# Patient Record
Sex: FEMALE | Race: WHITE | NOT HISPANIC OR LATINO | Employment: FULL TIME | URBAN - METROPOLITAN AREA
[De-identification: names, ages, dates, MRNs, and addresses within clinical notes are randomized per-mention and may not be internally consistent; named-entity substitution may affect disease eponyms.]

---

## 2019-09-16 ENCOUNTER — APPOINTMENT (OUTPATIENT)
Dept: PREADMISSION TESTING | Facility: HOSPITAL | Age: 39
End: 2019-09-16

## 2019-09-16 VITALS — HEIGHT: 66 IN | BODY MASS INDEX: 33.13 KG/M2 | WEIGHT: 206.13 LBS

## 2019-09-16 LAB
ANION GAP SERPL CALCULATED.3IONS-SCNC: 12 MMOL/L (ref 5–15)
BUN BLD-MCNC: 15 MG/DL (ref 6–20)
BUN/CREAT SERPL: 19.5 (ref 7–25)
CALCIUM SPEC-SCNC: 9.5 MG/DL (ref 8.6–10.5)
CHLORIDE SERPL-SCNC: 104 MMOL/L (ref 98–107)
CO2 SERPL-SCNC: 25 MMOL/L (ref 22–29)
CREAT BLD-MCNC: 0.77 MG/DL (ref 0.57–1)
DEPRECATED RDW RBC AUTO: 40.8 FL (ref 37–54)
ERYTHROCYTE [DISTWIDTH] IN BLOOD BY AUTOMATED COUNT: 11.9 % (ref 12.3–15.4)
GFR SERPL CREATININE-BSD FRML MDRD: 84 ML/MIN/1.73
GLUCOSE BLD-MCNC: 81 MG/DL (ref 65–99)
HCT VFR BLD AUTO: 42.1 % (ref 34–46.6)
HGB BLD-MCNC: 13.8 G/DL (ref 12–15.9)
MCH RBC QN AUTO: 30.4 PG (ref 26.6–33)
MCHC RBC AUTO-ENTMCNC: 32.8 G/DL (ref 31.5–35.7)
MCV RBC AUTO: 92.7 FL (ref 79–97)
PLATELET # BLD AUTO: 303 10*3/MM3 (ref 140–450)
PMV BLD AUTO: 9.6 FL (ref 6–12)
POTASSIUM BLD-SCNC: 4.5 MMOL/L (ref 3.5–5.2)
RBC # BLD AUTO: 4.54 10*6/MM3 (ref 3.77–5.28)
SODIUM BLD-SCNC: 141 MMOL/L (ref 136–145)
WBC NRBC COR # BLD: 7.23 10*3/MM3 (ref 3.4–10.8)

## 2019-09-16 PROCEDURE — 93005 ELECTROCARDIOGRAM TRACING: CPT

## 2019-09-16 PROCEDURE — 85027 COMPLETE CBC AUTOMATED: CPT | Performed by: SURGERY

## 2019-09-16 PROCEDURE — 80048 BASIC METABOLIC PNL TOTAL CA: CPT | Performed by: SURGERY

## 2019-09-16 PROCEDURE — 36415 COLL VENOUS BLD VENIPUNCTURE: CPT

## 2019-09-16 RX ORDER — DULOXETIN HYDROCHLORIDE 60 MG/1
60 CAPSULE, DELAYED RELEASE ORAL EVERY MORNING
COMMUNITY

## 2019-09-16 RX ORDER — OMEPRAZOLE 40 MG/1
40 CAPSULE, DELAYED RELEASE ORAL EVERY MORNING
COMMUNITY

## 2019-09-16 RX ORDER — NEBIVOLOL 10 MG/1
10 TABLET ORAL EVERY MORNING
COMMUNITY

## 2019-09-24 ENCOUNTER — ANESTHESIA EVENT (OUTPATIENT)
Dept: PERIOP | Facility: HOSPITAL | Age: 39
End: 2019-09-24

## 2019-09-24 RX ORDER — FAMOTIDINE 10 MG/ML
20 INJECTION, SOLUTION INTRAVENOUS ONCE
Status: CANCELLED | OUTPATIENT
Start: 2019-09-24 | End: 2019-09-24

## 2019-09-25 ENCOUNTER — HOSPITAL ENCOUNTER (OUTPATIENT)
Facility: HOSPITAL | Age: 39
Discharge: HOME | End: 2019-09-26
Attending: SURGERY | Admitting: SURGERY

## 2019-09-25 ENCOUNTER — ANESTHESIA (OUTPATIENT)
Dept: PERIOP | Facility: HOSPITAL | Age: 39
End: 2019-09-25

## 2019-09-25 DIAGNOSIS — E04.1 THYROID NODULE: ICD-10-CM

## 2019-09-25 LAB
B-HCG UR QL: NEGATIVE
INTERNAL NEGATIVE CONTROL: NEGATIVE
INTERNAL POSITIVE CONTROL: POSITIVE
Lab: NORMAL

## 2019-09-25 PROCEDURE — 88307 TISSUE EXAM BY PATHOLOGIST: CPT | Performed by: SURGERY

## 2019-09-25 PROCEDURE — 25010000002 ONDANSETRON PER 1 MG: Performed by: SURGERY

## 2019-09-25 PROCEDURE — G0378 HOSPITAL OBSERVATION PER HR: HCPCS

## 2019-09-25 PROCEDURE — 25010000002 FENTANYL CITRATE (PF) 100 MCG/2ML SOLUTION: Performed by: NURSE ANESTHETIST, CERTIFIED REGISTERED

## 2019-09-25 PROCEDURE — 25010000002 NEOSTIGMINE 10 MG/10ML SOLUTION: Performed by: NURSE ANESTHETIST, CERTIFIED REGISTERED

## 2019-09-25 PROCEDURE — 25010000002 MORPHINE PER 10 MG: Performed by: SURGERY

## 2019-09-25 PROCEDURE — 25010000002 DEXAMETHASONE PER 1 MG: Performed by: NURSE ANESTHETIST, CERTIFIED REGISTERED

## 2019-09-25 PROCEDURE — 25010000002 PHENYLEPHRINE PER 1 ML: Performed by: NURSE ANESTHETIST, CERTIFIED REGISTERED

## 2019-09-25 PROCEDURE — 25010000002 ONDANSETRON PER 1 MG: Performed by: NURSE ANESTHETIST, CERTIFIED REGISTERED

## 2019-09-25 PROCEDURE — 25010000002 PROPOFOL 10 MG/ML EMULSION: Performed by: NURSE ANESTHETIST, CERTIFIED REGISTERED

## 2019-09-25 PROCEDURE — 88334 PATH CONSLTJ SURG CYTO XM EA: CPT | Performed by: SURGERY

## 2019-09-25 PROCEDURE — 88331 PATH CONSLTJ SURG 1 BLK 1SPC: CPT | Performed by: PATHOLOGY

## 2019-09-25 PROCEDURE — 94799 UNLISTED PULMONARY SVC/PX: CPT

## 2019-09-25 PROCEDURE — 81025 URINE PREGNANCY TEST: CPT | Performed by: ANESTHESIOLOGY

## 2019-09-25 RX ORDER — NEBIVOLOL 2.5 MG/1
10 TABLET ORAL EVERY MORNING
Status: DISCONTINUED | OUTPATIENT
Start: 2019-09-26 | End: 2019-09-26 | Stop reason: HOSPADM

## 2019-09-25 RX ORDER — FAMOTIDINE 20 MG/1
20 TABLET, FILM COATED ORAL ONCE
Status: COMPLETED | OUTPATIENT
Start: 2019-09-25 | End: 2019-09-25

## 2019-09-25 RX ORDER — ONDANSETRON 2 MG/ML
4 INJECTION INTRAMUSCULAR; INTRAVENOUS EVERY 6 HOURS PRN
Status: DISCONTINUED | OUTPATIENT
Start: 2019-09-25 | End: 2019-09-26 | Stop reason: HOSPADM

## 2019-09-25 RX ORDER — ATRACURIUM BESYLATE 10 MG/ML
INJECTION, SOLUTION INTRAVENOUS AS NEEDED
Status: DISCONTINUED | OUTPATIENT
Start: 2019-09-25 | End: 2019-09-25 | Stop reason: SURG

## 2019-09-25 RX ORDER — ONDANSETRON 4 MG/1
4 TABLET, FILM COATED ORAL EVERY 6 HOURS PRN
Status: DISCONTINUED | OUTPATIENT
Start: 2019-09-25 | End: 2019-09-26 | Stop reason: HOSPADM

## 2019-09-25 RX ORDER — PROMETHAZINE HYDROCHLORIDE 25 MG/ML
12.5 INJECTION, SOLUTION INTRAMUSCULAR; INTRAVENOUS EVERY 6 HOURS PRN
Status: DISCONTINUED | OUTPATIENT
Start: 2019-09-25 | End: 2019-09-26 | Stop reason: HOSPADM

## 2019-09-25 RX ORDER — NALOXONE HCL 0.4 MG/ML
0.4 VIAL (ML) INJECTION
Status: DISCONTINUED | OUTPATIENT
Start: 2019-09-25 | End: 2019-09-26 | Stop reason: HOSPADM

## 2019-09-25 RX ORDER — SODIUM CHLORIDE 0.9 % (FLUSH) 0.9 %
3-10 SYRINGE (ML) INJECTION AS NEEDED
Status: DISCONTINUED | OUTPATIENT
Start: 2019-09-25 | End: 2019-09-25 | Stop reason: HOSPADM

## 2019-09-25 RX ORDER — FENTANYL CITRATE 50 UG/ML
INJECTION, SOLUTION INTRAMUSCULAR; INTRAVENOUS AS NEEDED
Status: DISCONTINUED | OUTPATIENT
Start: 2019-09-25 | End: 2019-09-25 | Stop reason: SURG

## 2019-09-25 RX ORDER — DEXAMETHASONE SODIUM PHOSPHATE 4 MG/ML
INJECTION, SOLUTION INTRA-ARTICULAR; INTRALESIONAL; INTRAMUSCULAR; INTRAVENOUS; SOFT TISSUE AS NEEDED
Status: DISCONTINUED | OUTPATIENT
Start: 2019-09-25 | End: 2019-09-25 | Stop reason: SURG

## 2019-09-25 RX ORDER — SODIUM CHLORIDE, SODIUM LACTATE, POTASSIUM CHLORIDE, CALCIUM CHLORIDE 600; 310; 30; 20 MG/100ML; MG/100ML; MG/100ML; MG/100ML
50 INJECTION, SOLUTION INTRAVENOUS CONTINUOUS
Status: DISCONTINUED | OUTPATIENT
Start: 2019-09-25 | End: 2019-09-26 | Stop reason: HOSPADM

## 2019-09-25 RX ORDER — SCOLOPAMINE TRANSDERMAL SYSTEM 1 MG/1
1 PATCH, EXTENDED RELEASE TRANSDERMAL ONCE
Status: DISCONTINUED | OUTPATIENT
Start: 2019-09-25 | End: 2019-09-26 | Stop reason: HOSPADM

## 2019-09-25 RX ORDER — OXYCODONE HYDROCHLORIDE AND ACETAMINOPHEN 5; 325 MG/1; MG/1
1 TABLET ORAL EVERY 4 HOURS PRN
Status: DISCONTINUED | OUTPATIENT
Start: 2019-09-25 | End: 2019-09-26 | Stop reason: HOSPADM

## 2019-09-25 RX ORDER — PROMETHAZINE HYDROCHLORIDE 25 MG/ML
12.5 INJECTION, SOLUTION INTRAMUSCULAR; INTRAVENOUS ONCE AS NEEDED
Status: DISCONTINUED | OUTPATIENT
Start: 2019-09-25 | End: 2019-09-25 | Stop reason: HOSPADM

## 2019-09-25 RX ORDER — FENTANYL CITRATE 50 UG/ML
50 INJECTION, SOLUTION INTRAMUSCULAR; INTRAVENOUS
Status: DISCONTINUED | OUTPATIENT
Start: 2019-09-25 | End: 2019-09-25 | Stop reason: HOSPADM

## 2019-09-25 RX ORDER — LIDOCAINE HYDROCHLORIDE 10 MG/ML
INJECTION, SOLUTION EPIDURAL; INFILTRATION; INTRACAUDAL; PERINEURAL AS NEEDED
Status: DISCONTINUED | OUTPATIENT
Start: 2019-09-25 | End: 2019-09-25 | Stop reason: SURG

## 2019-09-25 RX ORDER — PROMETHAZINE HYDROCHLORIDE 25 MG/1
25 SUPPOSITORY RECTAL ONCE AS NEEDED
Status: DISCONTINUED | OUTPATIENT
Start: 2019-09-25 | End: 2019-09-25 | Stop reason: HOSPADM

## 2019-09-25 RX ORDER — PROPOFOL 10 MG/ML
VIAL (ML) INTRAVENOUS AS NEEDED
Status: DISCONTINUED | OUTPATIENT
Start: 2019-09-25 | End: 2019-09-25 | Stop reason: SURG

## 2019-09-25 RX ORDER — PROMETHAZINE HYDROCHLORIDE 25 MG/1
25 TABLET ORAL ONCE AS NEEDED
Status: DISCONTINUED | OUTPATIENT
Start: 2019-09-25 | End: 2019-09-25 | Stop reason: HOSPADM

## 2019-09-25 RX ORDER — PANTOPRAZOLE SODIUM 40 MG/1
40 TABLET, DELAYED RELEASE ORAL EVERY MORNING
Status: DISCONTINUED | OUTPATIENT
Start: 2019-09-26 | End: 2019-09-26 | Stop reason: HOSPADM

## 2019-09-25 RX ORDER — NEOSTIGMINE METHYLSULFATE 1 MG/ML
INJECTION, SOLUTION INTRAVENOUS AS NEEDED
Status: DISCONTINUED | OUTPATIENT
Start: 2019-09-25 | End: 2019-09-25 | Stop reason: SURG

## 2019-09-25 RX ORDER — HYDROMORPHONE HYDROCHLORIDE 1 MG/ML
0.5 INJECTION, SOLUTION INTRAMUSCULAR; INTRAVENOUS; SUBCUTANEOUS
Status: DISCONTINUED | OUTPATIENT
Start: 2019-09-25 | End: 2019-09-25 | Stop reason: HOSPADM

## 2019-09-25 RX ORDER — DULOXETIN HYDROCHLORIDE 60 MG/1
60 CAPSULE, DELAYED RELEASE ORAL EVERY MORNING
Status: DISCONTINUED | OUTPATIENT
Start: 2019-09-26 | End: 2019-09-26 | Stop reason: HOSPADM

## 2019-09-25 RX ORDER — SODIUM CHLORIDE 0.9 % (FLUSH) 0.9 %
3 SYRINGE (ML) INJECTION EVERY 12 HOURS SCHEDULED
Status: DISCONTINUED | OUTPATIENT
Start: 2019-09-25 | End: 2019-09-25 | Stop reason: HOSPADM

## 2019-09-25 RX ORDER — MORPHINE SULFATE 4 MG/ML
4 INJECTION, SOLUTION INTRAMUSCULAR; INTRAVENOUS
Status: DISCONTINUED | OUTPATIENT
Start: 2019-09-25 | End: 2019-09-26 | Stop reason: HOSPADM

## 2019-09-25 RX ORDER — GLYCOPYRROLATE 0.2 MG/ML
INJECTION INTRAMUSCULAR; INTRAVENOUS AS NEEDED
Status: DISCONTINUED | OUTPATIENT
Start: 2019-09-25 | End: 2019-09-25 | Stop reason: SURG

## 2019-09-25 RX ORDER — ONDANSETRON 2 MG/ML
INJECTION INTRAMUSCULAR; INTRAVENOUS AS NEEDED
Status: DISCONTINUED | OUTPATIENT
Start: 2019-09-25 | End: 2019-09-25 | Stop reason: SURG

## 2019-09-25 RX ORDER — SODIUM CHLORIDE, SODIUM LACTATE, POTASSIUM CHLORIDE, CALCIUM CHLORIDE 600; 310; 30; 20 MG/100ML; MG/100ML; MG/100ML; MG/100ML
9 INJECTION, SOLUTION INTRAVENOUS CONTINUOUS
Status: DISCONTINUED | OUTPATIENT
Start: 2019-09-25 | End: 2019-09-26 | Stop reason: HOSPADM

## 2019-09-25 RX ORDER — MAGNESIUM HYDROXIDE 1200 MG/15ML
LIQUID ORAL AS NEEDED
Status: DISCONTINUED | OUTPATIENT
Start: 2019-09-25 | End: 2019-09-25 | Stop reason: HOSPADM

## 2019-09-25 RX ORDER — ONDANSETRON 2 MG/ML
4 INJECTION INTRAMUSCULAR; INTRAVENOUS ONCE AS NEEDED
Status: DISCONTINUED | OUTPATIENT
Start: 2019-09-25 | End: 2019-09-25 | Stop reason: HOSPADM

## 2019-09-25 RX ORDER — LIDOCAINE HYDROCHLORIDE 10 MG/ML
0.5 INJECTION, SOLUTION EPIDURAL; INFILTRATION; INTRACAUDAL; PERINEURAL ONCE AS NEEDED
Status: COMPLETED | OUTPATIENT
Start: 2019-09-25 | End: 2019-09-25

## 2019-09-25 RX ADMIN — MORPHINE SULFATE 4 MG: 4 INJECTION INTRAVENOUS at 12:37

## 2019-09-25 RX ADMIN — SODIUM CHLORIDE, POTASSIUM CHLORIDE, SODIUM LACTATE AND CALCIUM CHLORIDE 50 ML/HR: 600; 310; 30; 20 INJECTION, SOLUTION INTRAVENOUS at 12:23

## 2019-09-25 RX ADMIN — PHENYLEPHRINE HYDROCHLORIDE 100 MCG: 10 INJECTION INTRAVENOUS at 10:30

## 2019-09-25 RX ADMIN — SCOPALAMINE 1 PATCH: 1 PATCH, EXTENDED RELEASE TRANSDERMAL at 09:26

## 2019-09-25 RX ADMIN — ATRACURIUM BESYLATE 10 MG: 10 INJECTION, SOLUTION INTRAVENOUS at 09:50

## 2019-09-25 RX ADMIN — DEXAMETHASONE SODIUM PHOSPHATE 8 MG: 4 INJECTION, SOLUTION INTRAMUSCULAR; INTRAVENOUS at 09:45

## 2019-09-25 RX ADMIN — ATRACURIUM BESYLATE 40 MG: 10 INJECTION, SOLUTION INTRAVENOUS at 09:38

## 2019-09-25 RX ADMIN — OXYCODONE HYDROCHLORIDE AND ACETAMINOPHEN 1 TABLET: 5; 325 TABLET ORAL at 19:51

## 2019-09-25 RX ADMIN — ONDANSETRON 4 MG: 2 INJECTION INTRAMUSCULAR; INTRAVENOUS at 10:52

## 2019-09-25 RX ADMIN — GLYCOPYRROLATE 0.4 MG: 0.2 INJECTION, SOLUTION INTRAMUSCULAR; INTRAVENOUS at 11:01

## 2019-09-25 RX ADMIN — PROPOFOL 200 MG: 10 INJECTION, EMULSION INTRAVENOUS at 09:38

## 2019-09-25 RX ADMIN — NEOSTIGMINE METHYLSULFATE 2.5 MG: 1 INJECTION, SOLUTION INTRAVENOUS at 11:01

## 2019-09-25 RX ADMIN — PHENYLEPHRINE HYDROCHLORIDE 100 MCG: 10 INJECTION INTRAVENOUS at 10:41

## 2019-09-25 RX ADMIN — PHENYLEPHRINE HYDROCHLORIDE 50 MCG: 10 INJECTION INTRAVENOUS at 10:14

## 2019-09-25 RX ADMIN — FENTANYL CITRATE 50 MCG: 50 INJECTION INTRAMUSCULAR; INTRAVENOUS at 11:40

## 2019-09-25 RX ADMIN — SODIUM CHLORIDE, POTASSIUM CHLORIDE, SODIUM LACTATE AND CALCIUM CHLORIDE 9 ML/HR: 600; 310; 30; 20 INJECTION, SOLUTION INTRAVENOUS at 11:51

## 2019-09-25 RX ADMIN — FAMOTIDINE 20 MG: 20 TABLET ORAL at 09:13

## 2019-09-25 RX ADMIN — LIDOCAINE HYDROCHLORIDE 0.3 ML: 10 INJECTION, SOLUTION EPIDURAL; INFILTRATION; INTRACAUDAL; PERINEURAL at 09:12

## 2019-09-25 RX ADMIN — SODIUM CHLORIDE, POTASSIUM CHLORIDE, SODIUM LACTATE AND CALCIUM CHLORIDE: 600; 310; 30; 20 INJECTION, SOLUTION INTRAVENOUS at 10:15

## 2019-09-25 RX ADMIN — EPHEDRINE SULFATE 5 MG: 50 INJECTION INTRAMUSCULAR; INTRAVENOUS; SUBCUTANEOUS at 10:15

## 2019-09-25 RX ADMIN — ONDANSETRON 4 MG: 2 INJECTION INTRAMUSCULAR; INTRAVENOUS at 14:10

## 2019-09-25 RX ADMIN — LIDOCAINE HYDROCHLORIDE 50 MG: 10 INJECTION, SOLUTION EPIDURAL; INFILTRATION; INTRACAUDAL; PERINEURAL at 09:38

## 2019-09-25 RX ADMIN — SODIUM CHLORIDE, POTASSIUM CHLORIDE, SODIUM LACTATE AND CALCIUM CHLORIDE 9 ML/HR: 600; 310; 30; 20 INJECTION, SOLUTION INTRAVENOUS at 09:12

## 2019-09-25 RX ADMIN — PROPOFOL 25 MCG/KG/MIN: 10 INJECTION, EMULSION INTRAVENOUS at 09:44

## 2019-09-25 RX ADMIN — FENTANYL CITRATE 100 MCG: 50 INJECTION, SOLUTION INTRAMUSCULAR; INTRAVENOUS at 09:38

## 2019-09-25 RX ADMIN — PHENYLEPHRINE HYDROCHLORIDE 100 MCG: 10 INJECTION INTRAVENOUS at 10:35

## 2019-09-25 RX ADMIN — FENTANYL CITRATE 50 MCG: 50 INJECTION INTRAMUSCULAR; INTRAVENOUS at 11:25

## 2019-09-25 RX ADMIN — PHENYLEPHRINE HYDROCHLORIDE 50 MCG: 10 INJECTION INTRAVENOUS at 10:23

## 2019-09-25 RX ADMIN — GLYCOPYRROLATE 0.2 MG: 0.2 INJECTION, SOLUTION INTRAMUSCULAR; INTRAVENOUS at 10:08

## 2019-09-25 NOTE — ANESTHESIA POSTPROCEDURE EVALUATION
Patient: Carmen Lowe    Procedure Summary     Date:  09/25/19 Room / Location:   [facility] /  [facility]    Anesthesia Start:  0931 Anesthesia Stop:      Procedure:  LEFT THYROID LOBECTOMY (Left Neck) Diagnosis:      Surgeon:  Maulik Muniz MD Provider:  Anatoly Rose MD    Anesthesia Type:  general ASA Status:  3          Anesthesia Type: general  Last vitals  BP   146/95 (09/25/19 1111)   Temp   97.1 °F (36.2 °C) (09/25/19 1111)   Pulse   80 (09/25/19 1111)   Resp   16 (09/25/19 1111)     SpO2   93 % (09/25/19 1111)     Post Anesthesia Care and Evaluation    Patient location during evaluation: PACU  Patient participation: complete - patient participated  Level of consciousness: awake and alert  Pain score: 0  Pain management: adequate  Airway patency: patent  Anesthetic complications: No anesthetic complications  PONV Status: none  Cardiovascular status: hemodynamically stable and acceptable  Respiratory status: nonlabored ventilation, acceptable and nasal cannula  Hydration status: acceptable

## 2019-09-25 NOTE — PLAN OF CARE
Problem: Patient Care Overview  Goal: Plan of Care Review  Outcome: Ongoing (interventions implemented as appropriate)   09/25/19 1607 09/25/19 1657   OTHER   Outcome Summary --  VSS UOP ADQ, pain and nausea managed with PRN meds wd dressing intact will continue to monitor   Coping/Psychosocial   Plan of Care Reviewed With patient;spouse --      Goal: Discharge Needs Assessment  Outcome: Ongoing (interventions implemented as appropriate)      Problem: Pain, Acute (Adult)  Goal: Identify Related Risk Factors and Signs and Symptoms  Outcome: Ongoing (interventions implemented as appropriate)    Goal: Acceptable Pain Control/Comfort Level  Outcome: Ongoing (interventions implemented as appropriate)

## 2019-09-25 NOTE — BRIEF OP NOTE
THYROIDECTOMY  Progress Note    Carmen Lowe  9/25/2019    Pre-op Diagnosis:   Left thyroid nodule       Post-Op Diagnosis Codes:  Same    Procedure/CPT® Codes:      Procedure(s):  LEFT THYROID LOBECTOMY    Surgeon(s):  Maulik Muniz MD    Anesthesia: General    Staff:   Circulator: Comfort Torres RN; Lois Delgado RN  Scrub Person: Clare Richardson; Adela To; Sahnti Salter    Estimated Blood Loss: 5 mL    Urine Voided: * No values recorded between 9/25/2019  9:31 AM and 9/25/2019 11:09 AM *    Specimens:                Specimens     ID Source Type Tests Collected By Collected At Frozen?      A Thyroid Tissue · TISSUE PATHOLOGY EXAM   Maulik Muniz MD 9/25/19 1033 Yes     Description: Left Thyroid lobe Isthmus                Drains:      Findings: Benign appearing follicular lesion left thyroid lobe    Complications: None      Maulik Muniz MD     Date: 9/25/2019  Time: 11:15 AM

## 2019-09-25 NOTE — OP NOTE
THYROIDECTOMY  Procedure Note    Carmen Lowe  Date of Surgery:  9/25/2019    Pre-op Diagnosis:   Left thyroid nodule    Post-op Diagnosis:     Follicular lesion left thyroid lobe    Operative Procedure:   Left thyroid lobectomy and isthmusectomy    Indications:  Ms. Lowe is a 39-year-old female with a dominant left thyroid nodule.  She presents today for elective resection.    Operative note:  On 9/25/2019, the patient was taken to the operating room and placed supine on the operating table.  Following adequate induction of general endotracheal anesthesia, a roll was placed underneath the shoulders and the head neck carefully extended.  The neck and chest were prepped and draped in the standard fashion.  A transverse incision was made in the lower third of the neck.  The subcutaneous tissue was divided with the cautery.  A platysmal flap was raised to the level of the thyroid cartilage superiorly and the clavicular heads inferiorly.  The strap muscles were split in the midline.  All dissection was done under 3-1/2 power magnification.  The left thyroid lobe was approached by bluntly dissecting the strap muscles off an enlarged thyroid lobe with a dominant nodule in the lower pole.  Several small venous tributaries were divided on the substance of the gland with the bipolar cautery.  The superior pole vessels were next isolated and divided between clips.  The gland was further rotated medially and the superior parathyroid gland identified underneath the thyroid capsule at the top of the gland.  This was carefully and gently mobilized away from the thyroid substance, maintaining its vascular pedicle.  The left inferior parathyroid gland was never clearly identified, but did not appear to be adherent to the capsule of the thyroid.  The recurrent laryngeal nerve was identified coursing in its usual position in the tracheoesophageal groove.  The left thyroid lobe was then carefully and gently mobilized away  from the level of the nerve with a combination of blunt dissection and the bipolar cautery.  The left lobe and isthmus were then further mobilized across the anterior trachea where they were amputated over a hemostat.  The pedicle was suture-ligated with 3-0 silk.  The specimen was sent to pathology where frozen section examination was consistent with a benign appearing follicular lesion.  The neck was reinspected and irrigated.  Hemostasis appeared excellent.  The superior parathyroid gland appeared to be viable.  The recurrent laryngeal nerve was traced from its origin low in the neck up to its insertion into the cricothyroid membrane and appeared uninjured.  Gelfoam powder was sprayed in the wound and the strap muscles were reapproximated in the midline with running 3-0 Vicryl.  The subcutaneous tissue was reapproximated with running 3-0 Vicryl, and the skin closed with a running 4-0 Vicryl subcuticular suture.  A sterile dressing was applied in the operating room.  The patient tolerated the procedure well and there were no complications.  Estimated blood loss is 10 to 15 mL's.  Both preoperative and postoperative sponge and needle counts were correct.  The patient was taken to the recovery room in satisfactory condition.    Maulik Muniz MD     Date: 9/25/2019  Time: 11:15 AM

## 2019-09-25 NOTE — ANESTHESIA PROCEDURE NOTES
Airway  Urgency: elective    Date/Time: 9/25/2019 9:52 AM  Airway not difficult    General Information and Staff    Patient location during procedure: OR    Indications and Patient Condition  Indications for airway management: airway protection    Preoxygenated: yes  MILS not maintained throughout  Mask difficulty assessment: 1 - vent by mask    Final Airway Details  Final airway type: endotracheal airway      Successful airway: ETT  Cuffed: yes   Successful intubation technique: direct laryngoscopy  Endotracheal tube insertion site: oral  Blade: Kelsey  Blade size: 3  ETT size (mm): 7.5  Cormack-Lehane Classification: grade I - full view of glottis  Placement verified by: chest auscultation and capnometry   Measured from: lips  ETT/EBT  to lips (cm): 21  Number of attempts at approach: 1    Additional Comments  Negative epigastric sounds, Breath sound equal bilaterally with symmetric chest rise and fall

## 2019-09-25 NOTE — PROGRESS NOTES
"GEN SURG PROGRESS NOTE     LOS: 0 days   Patient Care Team:  Payal Simpson DO as PCP - General (Family Medicine)    Chief Complaint:      Subjective     Interval History:     Patient Complaints: Drowsy.  Sore throat.  Patient Denies:    History taken from:     Review of Systems:        Objective     Vital Signs  /92 (BP Location: Right arm, Patient Position: Lying)   Pulse 90   Temp 97.1 °F (36.2 °C) (Temporal)   Resp 16   Ht 167.6 cm (66\")   Wt 93.4 kg (206 lb)   LMP 09/06/2019 Comment: Mammogram never patient is 40 yo   SpO2 94%   BMI 33.25 kg/m²     Physical Exam:   Voice clear.  Dressing dry.  No neck swelling.     Results Review:     I reviewed the patient's new clinical results.  Labs  Lab Results (last 72 hours)     Procedure Component Value Units Date/Time    Tissue Pathology Exam [[id number]] Collected:  09/25/19 1033    Specimen:  Tissue from Thyroid Updated:  09/25/19 1045     Case Report --     Surgical Pathology Report                         Case: [id number]                                  Authorizing Provider:  Maulik Muniz MD         Collected:           09/25/2019 10:33 AM          Ordering Location:     [facility]   Received:            09/25/2019 10:42 AM                                 OR                                                                           Pathologist:           Dallas Daniels MD                                                        Intraop:               Dallas Daniels MD                                                        Specimen:    Thyroid, Left Thyroid lobe Isthmus                                                          Intraoperative Consultation --     Frozen section and touch preparation: Verbal report given to Dr. Muniz via speakerphone on 9/25/2019 at 10:44 AM.    FROZEN SECTION AND TOUCH PREPARATION DIAGNOSIS: Follicular nodule; no features of malignancy identified on representative frozen section and touch " preparation (PCC)              POC Urine Pregnancy Test [[id number]]  (Normal) Collected:  09/25/19 0912    Specimen:  Urine Updated:  09/25/19 0912     HCG, Urine, QL Negative     Lot Number [id number]     Comment: 2/21        Internal Positive Control Positive     Internal Negative Control Negative          Rads  Imaging Results (last 72 hours)     ** No results found for the last 72 hours. **          Assessment/Plan    Stable postop      Thyroid nodule        Maulik Muniz MD  09/25/19  2:24 PM

## 2019-09-25 NOTE — INTERVAL H&P NOTE
"[facility] Pre-op    Full history and physical note from office is up to date.  See office note attached.    BP (!) 168/111 (BP Location: Right arm, Patient Position: Lying)   Pulse 108   Temp 97.4 °F (36.3 °C) (Temporal)   Resp 18   Ht 167.6 cm (66\")   Wt 93.4 kg (206 lb)   LMP 09/06/2019 Comment: Mammogram never patient is 38 yo   SpO2 98%   BMI 33.25 kg/m²     LAB Results:  Lab Results   Component Value Date    WBC 7.23 09/16/2019    HGB 13.8 09/16/2019    HCT 42.1 09/16/2019    MCV 92.7 09/16/2019     09/16/2019    GLUCOSE 81 09/16/2019    BUN 15 09/16/2019    CREATININE 0.77 09/16/2019    EGFRIFNONA 84 09/16/2019     09/16/2019    K 4.5 09/16/2019     09/16/2019    CO2 25.0 09/16/2019    CALCIUM 9.5 09/16/2019       Cancer Staging (if applicable)  Cancer Patient: __ yes __no _X_unknown__N/A; If yes, clinical stage T:__ N:__M:__, stage group or __N/A    Assessment: Nontoxic multinodular goiter    Plan:   1. Left thyroid lobectomy  2. Anesthesia aware of elevated BP. Will continue to monitor closely.      Lilly Tillman, APRN 9/25/2019 9:16 AM  "

## 2019-09-25 NOTE — ANESTHESIA PREPROCEDURE EVALUATION
Anesthesia Evaluation     Patient summary reviewed and Nursing notes reviewed   history of anesthetic complications: PONV  NPO Solid Status: > 8 hours  NPO Liquid Status: > 2 hours           Airway   Mallampati: II  TM distance: >3 FB  Neck ROM: full  No difficulty expected  Dental - normal exam     Pulmonary - negative pulmonary ROS and normal exam    breath sounds clear to auscultation  Cardiovascular - normal exam    ECG reviewed  Rhythm: regular  Rate: normal    (+) hypertension, dysrhythmias Tachycardia,       Neuro/Psych- negative ROS  GI/Hepatic/Renal/Endo    (+) obesity,  GERD well controlled,  hypothyroidism,     Musculoskeletal     Abdominal    Substance History      OB/GYN          Other   (+) arthritis                     Anesthesia Plan    ASA 3     general     intravenous induction   Anesthetic plan, all risks, benefits, and alternatives have been provided, discussed and informed consent has been obtained with: patient.    Plan discussed with CRNA.

## 2019-09-26 VITALS
WEIGHT: 206 LBS | TEMPERATURE: 98.8 F | HEIGHT: 66 IN | SYSTOLIC BLOOD PRESSURE: 126 MMHG | OXYGEN SATURATION: 94 % | DIASTOLIC BLOOD PRESSURE: 71 MMHG | HEART RATE: 89 BPM | RESPIRATION RATE: 16 BRPM | BODY MASS INDEX: 33.11 KG/M2

## 2019-09-26 LAB
CYTO UR: NORMAL
LAB AP CASE REPORT: NORMAL
LAB AP CLINICAL INFORMATION: NORMAL
LAB AP DIAGNOSIS COMMENT: NORMAL
Lab: NORMAL
PATH REPORT.FINAL DX SPEC: NORMAL
PATH REPORT.GROSS SPEC: NORMAL

## 2019-09-26 RX ADMIN — PANTOPRAZOLE SODIUM 40 MG: 40 TABLET, DELAYED RELEASE ORAL at 06:04

## 2019-09-26 RX ADMIN — NEBIVOLOL HYDROCHLORIDE 10 MG: 2.5 TABLET ORAL at 06:04

## 2019-09-26 RX ADMIN — DULOXETINE HYDROCHLORIDE 60 MG: 60 CAPSULE, DELAYED RELEASE ORAL at 06:04

## 2019-09-26 NOTE — PLAN OF CARE
Problem: Patient Care Overview  Goal: Plan of Care Review  Outcome: Ongoing (interventions implemented as appropriate)   09/26/19 6864   OTHER   Outcome Summary Pain well controlled. Denies nausea. Tolerating diet. Voiding. Incision CDI. VSS.    Coping/Psychosocial   Plan of Care Reviewed With patient;spouse   Plan of Care Review   Progress improving     Goal: Individualization and Mutuality  Outcome: Ongoing (interventions implemented as appropriate)    Goal: Discharge Needs Assessment  Outcome: Ongoing (interventions implemented as appropriate)    Goal: Interprofessional Rounds/Family Conf  Outcome: Ongoing (interventions implemented as appropriate)      Problem: Pain, Acute (Adult)  Goal: Identify Related Risk Factors and Signs and Symptoms  Outcome: Ongoing (interventions implemented as appropriate)    Goal: Acceptable Pain Control/Comfort Level  Outcome: Ongoing (interventions implemented as appropriate)

## 2019-09-26 NOTE — PROGRESS NOTES
"Date of Discharge:  9/26/2019    Discharge Diagnosis:   Left thyroid nodule    Problem List:    Thyroid nodule      Presenting Problem/History of Present Illness  Nontoxic multinodular goiter [E04.2]  Thyroid nodule [E04.1]        Hospital Course  Patient is a 39 y.o. female presented with a large left thyroid nodule.  On 925, she underwent a left thyroid lobectomy.  Frozen section examination at the time of surgery was consistent with a benign follicular lesion.  Her postoperative course has been unremarkable.  She has been able to ambulate, void, and tolerate a diet.  Her pain has been reasonably controlled.  On the date of discharge, she is afebrile and in satisfactory condition.  Her voice is clear, her dressing is dry, and there is no neck swelling.  She is discharged home in satisfactory condition with wound care and activity limitations thoroughly discussed with her.  She will resume her medications as at home.  She declined a prescription for narcotics.  She will follow-up in my office in 2 weeks..      Procedures Performed  Procedure(s):  LEFT THYROID LOBECTOMY       Consults:   Consults     No orders found for last 30 day(s).          Pertinent Test Results:       Vital Signs  /74 (BP Location: Right arm, Patient Position: Lying)   Pulse 89   Temp 98.7 °F (37.1 °C)   Resp 16   Ht 167.6 cm (66\")   Wt 93.4 kg (206 lb)   LMP 09/06/2019 Comment: Mammogram never patient is 40 yo   SpO2 94%   BMI 33.25 kg/m²     Discharge Disposition  Where: home      Discharge Medications     Discharge Medications      Continue These Medications      Instructions Start Date   DULoxetine 60 MG capsule  Commonly known as:  CYMBALTA   60 mg, Oral, Every Morning      nebivolol 10 MG tablet  Commonly known as:  BYSTOLIC   10 mg, Oral, Every Morning      omeprazole 40 MG capsule  Commonly known as:  priLOSEC   40 mg, Oral, Every Morning      Vitamin D3 35414 units tablet   1 tablet, Oral, Weekly, Fridays        "       Discharge Diet   Regular    Activity at Discharge  Ad lady.    Follow-up Appointments  Dr. muniz in 2 weeks      Test Results Pending at Discharge  Final pathology      Maulik Muniz MD  09/26/19  7:34 AM

## 2024-01-30 ENCOUNTER — OFFICE VISIT (OUTPATIENT)
Dept: ENDOCRINOLOGY | Facility: CLINIC | Age: 44
End: 2024-01-30
Payer: COMMERCIAL

## 2024-01-30 VITALS
HEIGHT: 66 IN | OXYGEN SATURATION: 99 % | BODY MASS INDEX: 33.59 KG/M2 | WEIGHT: 209 LBS | SYSTOLIC BLOOD PRESSURE: 120 MMHG | HEART RATE: 70 BPM | DIASTOLIC BLOOD PRESSURE: 70 MMHG

## 2024-01-30 DIAGNOSIS — E04.1 THYROID NODULE: Primary | ICD-10-CM

## 2024-01-30 LAB — TSH SERPL DL<=0.05 MIU/L-ACNC: 4.08 UIU/ML (ref 0.27–4.2)

## 2024-01-30 PROCEDURE — 84443 ASSAY THYROID STIM HORMONE: CPT | Performed by: INTERNAL MEDICINE

## 2024-01-30 RX ORDER — LEVOTHYROXINE SODIUM 0.05 MG/1
50 TABLET ORAL DAILY
COMMUNITY
Start: 2023-12-12

## 2024-01-30 NOTE — PROGRESS NOTES
"     Office Note      Date: 2024  Patient Name: Carmen Lowe  MRN: [id number]  : 1980    Chief Complaint   Patient presents with    Thyroid Problem     Nodule       History of Present Illness:   Carmen Lowe is a 44 y.o. female who presents for Thyroid Problem (Nodule)  .   Patient is seen for a new patient evaluation  Pt had left hemithyroidectomy in  for a large nodule.  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  Her tsh was normal in   She had an ultrasound in 2023 which showed absent left thyroid lobe and a 1 cm trad 4 nodule in the right  lobe  ------------------------------  [place]  No hi  iodine intake  No facial radiation exposure  Family = brother and grandfather have thyroid disease.  Grand father had thyroid cancer.  Subjective          Review of Systems:   Review of Systems   Constitutional:  Positive for fatigue. Negative for unexpected weight change.   HENT:  Negative for trouble swallowing and voice change.         Feels something in her neck when she swallows    Eyes:  Negative for visual disturbance.   Cardiovascular:  Negative for palpitations.   Endocrine: Negative for cold intolerance and heat intolerance.   Neurological:  Negative for tremors.   Psychiatric/Behavioral:  Positive for dysphoric mood. Negative for sleep disturbance. The patient is nervous/anxious.        The following portions of the patient's history were reviewed and updated as appropriate: allergies, current medications, past family history, past medical history, past social history, past surgical history, and problem list.    Objective     Visit Vitals  /70 (BP Location: Left arm, Patient Position: Sitting, Cuff Size: Adult)   Pulse 70   Ht 167.6 cm (66\")   Wt 94.8 kg (209 lb)   SpO2 99%   BMI 33.73 kg/m²           Physical Exam:  Physical Exam  Vitals reviewed. "   Constitutional:       General: She is not in acute distress.     Appearance: She is not ill-appearing, toxic-appearing or diaphoretic.   HENT:      Head: Normocephalic and atraumatic.   Eyes:      Extraocular Movements: Extraocular movements intact.   Neck:      Comments: Right lobe is firm, without palpable nodules. Left lobe is surgically absent   Cardiovascular:      Rate and Rhythm: Normal rate.   Pulmonary:      Effort: Pulmonary effort is normal.   Lymphadenopathy:      Cervical: No cervical adenopathy.   Neurological:      Mental Status: She is alert.   Psychiatric:         Mood and Affect: Mood normal.         Thought Content: Thought content normal.         Judgment: Judgment normal.         Assessment / Plan      Assessment & Plan:  Problem List Items Addressed This Visit          Other    Thyroid nodule - Primary    Overview     Pt had left hemithyroidectomy in 2019 for a large nodule.  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  Her tsh was normal in 2022  She had an ultrasound in December 2023 which showed absent left thyroid lobe and a 1 cm trad 4 nodule in the right  lobe  ------------------------------  [place] was [place]  No hi  iodine intake  No facial radiation exposure  Family = brother and grandfather have thyroid disease.  Grand father had thyroid cancer.  ----------------------         Current Assessment & Plan     Assessement: she has a 1 cm trad 4 nodule in her remaining right lobe.  This is not large enough to warrant FNA at this time but repeat ultrasound in 6 months should be planned.  She has dysphagia which is probably multifactorial= the thyroid and gerd are likely contributing.   Plan: check labs today. If possible, increase thyroid medication . Plan repeat ultrasound in 6 montghs          Relevant Medications    nebivolol (BYSTOLIC) 10 MG tablet    levothyroxine (SYNTHROID, LEVOTHROID) 50  MCG tablet    Other Relevant Orders    TSH        Electronically signed by  : Sid Can MD   01/30/2024

## 2024-01-30 NOTE — ASSESSMENT & PLAN NOTE
Assessement: she has a 1 cm trad 4 nodule in her remaining right lobe.  This is not large enough to warrant FNA at this time but repeat ultrasound in 6 months should be planned.  She has dysphagia which is probably multifactorial= the thyroid and gerd are likely contributing.   Plan: check labs today. If possible, increase thyroid medication . Plan repeat ultrasound in 6 [facility]

## 2024-02-06 RX ORDER — LEVOTHYROXINE SODIUM 0.07 MG/1
75 TABLET ORAL DAILY
Qty: 90 TABLET | Refills: 3 | Status: SHIPPED | OUTPATIENT
Start: 2024-02-06 | End: 2025-02-05

## (undated) DEVICE — PK MINOR SPLT 10

## (undated) DEVICE — DRSNG SURESITE WNDW 4X4.5

## (undated) DEVICE — MAGNETIC DRAPE: Brand: DEVON

## (undated) DEVICE — GLV SURG BIOGEL LTX PF 7 1/2

## (undated) DEVICE — ANTIBACTERIAL UNDYED BRAIDED (POLYGLACTIN 910), SYNTHETIC ABSORBABLE SUTURE: Brand: COATED VICRYL

## (undated) DEVICE — DISH PETRI 3.5IN MD STRL LF

## (undated) DEVICE — CAUTERY TIP POLISHER: Brand: DEVON

## (undated) DEVICE — APPL CHLORAPREP W/TINT 26ML BLU

## (undated) DEVICE — DRSNG TELFA ILND ADH 4X6IN

## (undated) DEVICE — COVER,LIGHT HANDLE,FLX,1/PK: Brand: MEDLINE INDUSTRIES, INC.

## (undated) DEVICE — SUT SILK 2/0 TIES 18IN A185H

## (undated) DEVICE — GOWN,REINF,POLY,ECL,PP SLV,XL: Brand: MEDLINE

## (undated) DEVICE — IRRIGATOR BULB ASEPTO 60CC STRL

## (undated) DEVICE — 3M™ TEGADERM™ IV TRANSPARENT FILM DRESSING WITH BORDER 100 BAGS/CARTON 4 CARTONS/CASE 1633: Brand: 3M™ TEGADERM™

## (undated) DEVICE — 3M™ STERI-STRIP™ REINFORCED ADHESIVE SKIN CLOSURES, R1547, 1/2 IN X 4 IN (12 MM X 100 MM), 6 STRIPS/ENVELOPE: Brand: 3M™ STERI-STRIP™

## (undated) DEVICE — ELECTRD BLD 1P STD SS 3/32X2.44IN

## (undated) DEVICE — GAUZE,SPONGE,4"X4",16PLY,XRAY,STRL,LF: Brand: MEDLINE

## (undated) DEVICE — SUT SILK 3/0 TIES 18IN A184H

## (undated) DEVICE — DISPOSABLE BIPOLAR FORCEPS 5 1/2" (14CM) SEMKIN, 0.7MM TIP AND 12 FT. (3.6M) CABLE: Brand: KIRWAN